# Patient Record
(demographics unavailable — no encounter records)

---

## 2025-01-13 NOTE — HISTORY OF PRESENT ILLNESS
[FreeTextEntry1] : Here to establish primary care and for annual CPE Would also like to discuss: 1. Familial HLD 2. Acute HAs x2 months 3. Chronic GI issues  4. Chronic PND [de-identified] : HLD, familial (fam hx as noted) Sees cardiology History of transaminitis not related to statin Statin taken off due to liver enzyme elevation Has seen hepatologist, s/p liver biopsy, no identifiable cause Has been switched to Repatha  Would like to get fasting labs here   HAs x2 months Top of head and bitemporal, no aura, goes away w/ PRN Advil  Has had R eye Lasik but thinks may need glasses despite and is wondering if HA due to eye strain No personal or fam hx of migraine, no exercise, sleeps mostly well  Alternating constipation and diarrhea, chronically Says BMs are never normal, current issue is constipation  No abd pain, bloating, nausea, no const symptoms such as weight loss, fatigue, rashes/sores etc  Seen GI: Advised Miralax, probiotic, fiber supplement No fam hx of colon ca., never had colonoscopy   Chronic PND, keeps spitting clear phlegm History of taking daily otc allergy med Has used Flonase in the past which helped, requesting refill   Vaccines: COVID/Flu - UTD annual boosters, recently received AR here so is UTD with Tdap

## 2025-01-13 NOTE — ASSESSMENT
[FreeTextEntry1] : #Familial HLD #History of transaminitis of unknown etiology - Labs as ordered - Gets Repatha from his cardiologist - Recently had a baby, advised to make pediatrician aware of family hx    #Acute HAs x2 months Mild and manageable, not intractable Given acute nature and bitemporal location, reasonable to get vision check first - Refer to optometry - If no improvement after any necessary vision correction, advised to f/u   #Alternating constipation and diarrhea, chronic Given absence of abd pain, low concern for IBS - Advised to gradually optimize dietary fiber - Referred to relevant podcasts by a PhD in nutrition, advised to consider working with a dietitian in future   #Chronic PND - Prescribe PRN Flonase  #HCM - Vaccines: COVID/Flu - UTD annual boosters / Tdap - UTD as per him ~2023 - PHQ-2 negative

## 2025-01-13 NOTE — PHYSICAL EXAM
[No Acute Distress] : no acute distress [Well Nourished] : well nourished [Well Developed] : well developed [Well-Appearing] : well-appearing [Normal Voice/Communication] : normal voice/communication [Normal Sclera/Conjunctiva] : normal sclera/conjunctiva [EOMI] : extraocular movements intact [Normal Outer Ear/Nose] : the outer ears and nose were normal in appearance [Supple] : supple [No Respiratory Distress] : no respiratory distress  [No Accessory Muscle Use] : no accessory muscle use [Clear to Auscultation] : lungs were clear to auscultation bilaterally [Regular Rhythm] : with a regular rhythm [Normal S1, S2] : normal S1 and S2 [No Murmur] : no murmur heard [No Edema] : there was no peripheral edema [Soft] : abdomen soft [Non Tender] : non-tender [Non-distended] : non-distended [No Joint Swelling] : no joint swelling [No Rash] : no rash [Normal Gait] : normal gait [Speech Grossly Normal] : speech grossly normal [Memory Grossly Normal] : memory grossly normal [Normal Affect] : the affect was normal [Alert and Oriented x3] : oriented to person, place, and time [Normal Mood] : the mood was normal [Normal Insight/Judgement] : insight and judgment were intact

## 2025-01-13 NOTE — HEALTH RISK ASSESSMENT
[Yes] : Yes [2 - 4 times a month (2 pts)] : 2-4 times a month (2 points) [1 or 2 (0 pts)] : 1 or 2 (0 points) [Never (0 pts)] : Never (0 points) [No] : In the past 12 months have you used drugs other than those required for medical reasons? No [Little interest or pleasure doing things] : 1) Little interest or pleasure doing things [Feeling down, depressed, or hopeless] : 2) Feeling down, depressed, or hopeless [0] : 2) Feeling down, depressed, or hopeless: Not at all (0) [PHQ-2 Negative - No further assessment needed] : PHQ-2 Negative - No further assessment needed [Never] : Never [Audit-CScore] : 2 [de-identified] : Could do more, has a baby so adjusting  [de-identified] : No red meat, fairly healthy diet with minimal UPFs, chicken/fish for protein [[id number]] : 0

## 2025-01-13 NOTE — PLAN
[FreeTextEntry1] : Labs drawn in office Of note, is fasting for lipid panel If fasting glucose elevated, will check A1C